# Patient Record
Sex: MALE | Race: OTHER
[De-identification: names, ages, dates, MRNs, and addresses within clinical notes are randomized per-mention and may not be internally consistent; named-entity substitution may affect disease eponyms.]

---

## 2023-01-13 ENCOUNTER — HOSPITAL ENCOUNTER (EMERGENCY)
Dept: HOSPITAL 15 - ER | Age: 23
Discharge: LEFT BEFORE DISCHARGE | End: 2023-01-13
Payer: SELF-PAY

## 2023-01-13 VITALS — WEIGHT: 220.46 LBS | BODY MASS INDEX: 33.41 KG/M2 | HEIGHT: 68 IN

## 2023-01-13 VITALS — DIASTOLIC BLOOD PRESSURE: 51 MMHG | SYSTOLIC BLOOD PRESSURE: 101 MMHG

## 2023-01-13 DIAGNOSIS — Z79.899: ICD-10-CM

## 2023-01-13 DIAGNOSIS — T20.16XA: Primary | ICD-10-CM

## 2023-01-13 LAB
ALBUMIN SERPL-MCNC: 3.4 G/DL (ref 3.4–5)
ALP SERPL-CCNC: 53 U/L (ref 45–117)
ALT SERPL-CCNC: 31 U/L (ref 16–61)
ANION GAP SERPL CALCULATED.3IONS-SCNC: 2 MMOL/L (ref 5–15)
BILIRUB SERPL-MCNC: 0.4 MG/DL (ref 0.2–1)
BUN SERPL-MCNC: 13 MG/DL (ref 7–18)
BUN/CREAT SERPL: 12
CALCIUM SERPL-MCNC: 8.5 MG/DL (ref 8.5–10.1)
CHLORIDE SERPL-SCNC: 110 MMOL/L (ref 98–107)
CK SERPL-CCNC: 105 U/L (ref 39–308)
CO2 SERPL-SCNC: 30 MMOL/L (ref 21–32)
GLUCOSE SERPL-MCNC: 98 MG/DL (ref 74–106)
HCT VFR BLD AUTO: 41.4 % (ref 41–53)
HGB BLD-MCNC: 13.9 G/DL (ref 13.5–17.5)
MCH RBC QN AUTO: 27.4 PG (ref 28–32)
MCV RBC AUTO: 81.8 FL (ref 80–100)
NRBC BLD QL AUTO: 0 %
POTASSIUM SERPL-SCNC: 4 MMOL/L (ref 3.5–5.1)
PROT SERPL-MCNC: 6.6 G/DL (ref 6.4–8.2)
SODIUM SERPL-SCNC: 142 MMOL/L (ref 136–145)

## 2023-01-13 PROCEDURE — 80053 COMPREHEN METABOLIC PANEL: CPT

## 2023-01-13 PROCEDURE — 36415 COLL VENOUS BLD VENIPUNCTURE: CPT

## 2023-01-13 PROCEDURE — 85025 COMPLETE CBC W/AUTO DIFF WBC: CPT

## 2023-01-13 PROCEDURE — 71045 X-RAY EXAM CHEST 1 VIEW: CPT

## 2023-01-13 PROCEDURE — 99285 EMERGENCY DEPT VISIT HI MDM: CPT

## 2023-01-13 PROCEDURE — 96361 HYDRATE IV INFUSION ADD-ON: CPT

## 2023-01-13 PROCEDURE — 93005 ELECTROCARDIOGRAM TRACING: CPT

## 2023-01-13 PROCEDURE — 96374 THER/PROPH/DIAG INJ IV PUSH: CPT

## 2023-01-13 PROCEDURE — 16000 INITIAL TREATMENT OF BURN(S): CPT

## 2023-01-13 PROCEDURE — 82550 ASSAY OF CK (CPK): CPT

## 2023-01-13 PROCEDURE — 83605 ASSAY OF LACTIC ACID: CPT

## 2024-12-17 ENCOUNTER — HOSPITAL ENCOUNTER (EMERGENCY)
Dept: HOSPITAL 15 - ER | Age: 24
Discharge: HOME | End: 2024-12-17
Payer: MEDICAID

## 2024-12-17 VITALS — SYSTOLIC BLOOD PRESSURE: 153 MMHG | TEMPERATURE: 100.4 F | HEART RATE: 98 BPM | DIASTOLIC BLOOD PRESSURE: 89 MMHG

## 2024-12-17 VITALS — HEIGHT: 65 IN | WEIGHT: 261.25 LBS | BODY MASS INDEX: 43.53 KG/M2

## 2024-12-17 VITALS — OXYGEN SATURATION: 97 % | RESPIRATION RATE: 17 BRPM

## 2024-12-17 DIAGNOSIS — J10.1: Primary | ICD-10-CM

## 2024-12-17 DIAGNOSIS — Z20.822: ICD-10-CM

## 2024-12-17 DIAGNOSIS — Z79.899: ICD-10-CM

## 2024-12-17 LAB — SARS-COV+SARS-COV-2 AG RESP QL IA.RAPID: NEGATIVE

## 2024-12-17 PROCEDURE — 71046 X-RAY EXAM CHEST 2 VIEWS: CPT

## 2024-12-17 PROCEDURE — 87804 INFLUENZA ASSAY W/OPTIC: CPT

## 2024-12-17 PROCEDURE — 36415 COLL VENOUS BLD VENIPUNCTURE: CPT

## 2024-12-17 PROCEDURE — 87426 SARSCOV CORONAVIRUS AG IA: CPT

## 2024-12-17 NOTE — ED.PDOC
History of Present Illness


HPI Comments


24-year-old male patient presents to the ER for cough, fever and chills, throat 

pain, bilateral ear pain, muscle aches and weakness.  Patient reports that 

symptoms started on Saturday.  Patient has been taking TheraFlu and NyQuil.  

Patient states that he has been sleeping all day but that he feels worse today 

than he did Saturday.  Patient has no medical history.  Patient also complaining

of diarrhea.  Patient is able to tolerate food and fluids


Chief Complaint:  Flu like


Time Seen by MD:  13:43


Primary Care Provider:  NONE


Allergies:  


Coded Allergies:  


     NO KNOWN ALLERGIES (Unverified , 23)


Home Meds


Active Scripts


Ibuprofen (Ibuprofen) 600 Mg Tab, 600 MG PO Q8HPRN PRN for 10 Days, #30 TAB 0 

Refills


   Prov:JORDYNDIALLO [facility]         24


Bokmgrxqpzs-Hlqtfihd-Ft (Bromphen/Pseudoephedrine 30-2-10 mg/5Ml) 1 Syp Syp, 1 

SYP PO Q6HPRN PRN for 10 Days, #400 ML 0 Refills


   Prov:JORDYNDIALLO [facility]         24


Oseltamivir Phosphate (Tamiflu) 75 Mg Cap, 1 CAP PO BID for 5 Days, #10 CAP 0 

Refills


   Prov:JORDYNDIALLO [facility]         24


Prednisone (Prednisone) 20 Mg Tab, 20 MG PO DAILY for 5 Days, #5 MG


   Prov:MIGUEL BRAND DO         23


Hydrocodone-Acetaminophen (Hydrocodone Bitartrate/AC 5-325 mg) 1 Tab Tab, 1 TAB 

PO V33PRTP PRN for 5 Days, #10 TAB


   Prov:MIGUEL BRAND DO         23


Cephalexin Monohydrate (Cephalexin) 500 Mg Cap, 500 MG PO TID for 5 Days, #15 MG


   Prov:MIGUEL BRAND DO         23


Mode of Arrival:  Ambulatory





Past Medical History


PAST MEDICAL HISTORY:  Denies


Surgical History:  Denies all surgeries





Family History


Family History:  Reviewed,noncontributory to illness, Unknown





Social History


Smoker:  Non-Smoker


Alcohol:  Denies ETOH Use


Drugs:  Denies Drug Use





Constitutional:  reports: chills, fever


EENTM:  reports: throat pain


Respiratory:  reports: cough


Cardiovascular:  denies: chest pain, dizzy spells, diaphoresis, Dyspnea on 

exertion, edema, irregular heart beat, left arm pain, lightheadedness, 

palpitations, PND, syncope, others


Gastrointestinal:  reports: diarrhea; denies: abdomen distended, abdominal pain,

blood streaked bowels, constipated, dysphagia, difficulty swallowing, 

hematemesis, melena, nausea, poor appetite, poor fluid intake, rectal bleeding, 

rectal pain, vomiting, others


Genitourinary:  denies: burning, dysuria, flank pain, frequency, hematuria, 

incontinence, penile discharge, penile sore, pain, testicle pain, testicle 

swelling, urgency, others


Neurological:  denies: dizziness, fainting, headache, left sided numbness, left 

sided weakness, numbness, paresthesia, pre-existing deficit, right sided 

numbness, right sided weakness, seizure, speech problems, tingling, tremors, 

weakness, others


Musculoskeletal:  denies: back pain, gout, joint pain, joint swelling, muscle 

pain, muscle stiffness, neck pain, others


Integumetry:  denies: bruises, change in color, change in hair/nails, dryness, 

laceration, lesions, lumps, rash, wounds, others


Allergic/Immunocompromised:  denies: Difficulty Healing, Frequent Infections, 

Hives, Itching, others


Hematologic/Lymphatic:  denies: anemia, blood clots, easy bleeding, easy 

bruising, swollen glands, others


Endocrine:  denies: excessive hunger, excessive sweating, excessive thirst, 

excessive urination, flushing, intolerance to cold, intolerance to heat, 

unexplained weight gain, unexplained weight loss, others


Psychiatric:  denies: anxiety, bipolar disorder, depression, hopeless, panic 

disorder, schizophrenia, sleepless, suicidal, others





Physical Exam


General Appearance:  No Apparent Distress, Normal


HEENT:  Pharyngeal Erythema (Mild erythema), TMs Normal


Neck:  Full Range of Motion, Non-Tender, Normal, Normal Inspection


Respiratory:  Chest Non-Tender, Lungs Clear, No Accessory Muscle Use, No 

Respiratory Distress, Normal Breath Sounds


Cardiovascular:  No Edema, No JVD, No Murmur, No Gallop, Normal Peripheral 

Pulses, Regular Rate/Rhythm


Breast Exam:  Deferred


Gastrointestinal:  No Organomegaly, Non Tender, No Pulsatile Mass, Normal Bowel 

Sounds, Soft


Genitalia:  Deferred


Pelvic:  Deferred


Rectal:  Deferred


Extremities:  No calf tenderness, Normal capillary refill, Normal inspection, No

rmal range of motion, Non-tender, No pedal edema


Musculoskeletal :  


   Apperance:  Normal


Neurologic:  Alert, CNs II-XII nml as Tested, No Motor Deficits, Normal Affect, 

Normal Mood, No Sensory Deficits


Cerebellar Function:  Normal


Reflexes:  Normal


Skin:  Dry, Normal Color, Warm


Lymphatic:  No Adenopathy





Was a procedure done?


Was a procedure done?:  No





Differential Dx


Considerations may include:


Pneumonia, upper respiratory infection, bronchitis, COVID, influenza





X-Ray, Labs, Meds, VS





                                   Vital Signs








  Date Time  Temp Pulse Resp B/P (MAP) Pulse Ox O2 Delivery O2 Flow Rate FiO2


 


24 14:53   17  97 Room Air* 0 21


 


24 14:51 100.4 98 17 153/89 (110) 100   





 100.4       


 


24 13:29 100.0 97 20 152/90 (110) 100   





 100.0       


 


24 11:55 97.8 114 20 134/89 (104) 100   








                                       Lab








Test


 24


13:28 Range/Units


 


 


Influenza Type A Antigen Positive  Negative  


 


Influenza Type B Antigen Negative  Negative  


 


SARS-CoV-2 Antigen (Rapid) Negative  NEGATIVE  





PATIENT: SCARLETT MARINOCCT: [id number]: [id number]


: 2000           LOC: ER                   ROOM / BED:  / 


AGE / SEX: 24 / M         ADM STATUS: REG ER        SERVICE DT: 24 1354





ORDERING PHYSICIAN: DIALLO COBB


PROCEDURE(s): CXR2 - CHEST TWO VIEWS ROUTINE


REASON: cough x 4 days with brown sputum


ORDER NUMBER(s): [id number], ACCESSION NUMBER(s): [id number]














XY CHEST TWO VIEWS ROUTINE





CLINICAL HISTORY: cough x 4 days with brown sputum





COMPARISON: None





TECHNIQUE: Frontal and lateral view of the chest was obtained





FINDINGS:





Lines and Tubes: None





Lungs: No focal consolidation.





Pleura: No effusion. No pneumothorax.





Cardiomediastinal contours: Unremarkable





Bones: No acute osseous abnormality.





IMPRESSION: 





1. No radiographic evidence of acute cardiopulmonary disease.





HS:Y





Electronically Signed by: Luis Angel Jung at 2024 14:14:29 PM











DICTATED BY: LUIS ANGEL JUNG DO


DICTATED DATE/TIME: 24 1414





SIGNED BY: LUIS ANGEL JUNG DO


SIGNED DATE/TIME: 24 1414





CC: 





X-Ray, Labs, Meds, VS Comment


24-year-old male patient presenting with cough, body aches, headache, fever and 

chills x3 days.  Patient advised that he is positive for influenza A.  Patient 

instructed on good hand hygiene and infection precautions.  Patient prescribed 

Bromfed, Tamiflu and ibuprofen.  Patient instructed on how to take these 

medications.  Patient to follow up with primary care physician.





On re-evaluation patient has symptomatic improvement.


Patient is stable for discharge at this time.


All test results and diagnostic imaging have been interpreted.


All diagnostic findings, discharge care, and education instruction provided to 

the patient.


Follow-up with PCP in 2-3 days


Patient verbalized understanding, discharge instructions and agrees to treatment

plan


Vital signs are stable


Patient is ambulatory


Patient advised of which symptoms necessitate a return visit to the emergency 

room.  Patient to return emergency room for any new worsening symptoms.


Patient is aware that the purpose of this visit is for an acute medical 

emergency requiring emergent stabilization.  Chronic conditions, including 

malignancies have not been ruled out.  Patient is instructed to follow up with 

PCP as directed for continued care and workup.  If unable to arrange follow up, 

patient is to return to the emergency room for reassessment.  Patient was given 

verbal and written discharge instructions and acknowledges understanding


Time of 1ST Reevaluation:  14:37


Reevaluation 1ST:  Unchanged


Patient Education/Counseling:  Diagnosis, Treatment, Prognosis, Need For Follow 

Up


Family Education/Counseling:  Diagnosis, Treatment, Prognosis, Need For Follow 

Up





Departure 1


Departure


Time of Disposition:  14:34


Impression:  


   Primary Impression:  


   Influenza A


Disposition:  01 HOME / SELF CARE / HOMELESS


Condition:  Stable


e-Prescriptions


Ibuprofen (Ibuprofen) 600 Mg Tab


600 MG PO Q8HPRN PRN for 10 Days, #30 TAB 0 Refills


   Prov: JORDYNDIALLO [facility]         24 


Stqoqxusirh-Xiqqthft-Ez (Bromphen/Pseudoephedrine 30-2-10 mg/5Ml) 1 Syp Syp


1 SYP PO Q6HPRN PRN for 10 Days, #400 ML 0 Refills


   Prov: DIALLO COBB [facility]         24 


Oseltamivir Phosphate (Tamiflu) 75 Mg Cap


1 CAP PO BID for 5 Days, #10 CAP 0 Refills


   Prov: DIALLO COBB [facility]         24





Critical Care Note


Critical Care Time?:  No





Stability


Stability form required:  No





Heart Score


Heart Score:  








Heart Score Response (Comments) Value


 


History N/A 0


 


EKG N/A 0


 


Age N/A 0


 


Risk Factors N/A 0


 


Troponin N/A 0


 


Total  0

















DIALLO COBB [facility]              Dec 17, 2024 14:41

## 2024-12-17 NOTE — DVH
XY CHEST TWO VIEWS ROUTINE



CLINICAL HISTORY: cough x 4 days with brown sputum



COMPARISON: None



TECHNIQUE: Frontal and lateral view of the chest was obtained



FINDINGS:



Lines and Tubes: None



Lungs: No focal consolidation.



Pleura: No effusion. No pneumothorax.



Cardiomediastinal contours: Unremarkable



Bones: No acute osseous abnormality.



IMPRESSION: 



1. No radiographic evidence of acute cardiopulmonary disease.



HS:Y



Electronically Signed by: Eddie Yates at 12/17/2024 14:14:29 PM